# Patient Record
Sex: FEMALE | Race: WHITE | URBAN - METROPOLITAN AREA
[De-identification: names, ages, dates, MRNs, and addresses within clinical notes are randomized per-mention and may not be internally consistent; named-entity substitution may affect disease eponyms.]

---

## 2021-11-24 ENCOUNTER — OFFICE VISIT (OUTPATIENT)
Dept: INTEGRATIVE MEDICINE | Facility: CLINIC | Age: 31
End: 2021-11-24
Payer: COMMERCIAL

## 2021-11-24 DIAGNOSIS — R10.30 LOWER ABDOMINAL PAIN: Primary | ICD-10-CM

## 2021-11-24 PROCEDURE — 97810 ACUP 1/> WO ESTIM 1ST 15 MIN: CPT | Performed by: ACUPUNCTURIST

## 2021-11-24 PROCEDURE — 97811 ACUP 1/> W/O ESTIM EA ADD 15: CPT | Performed by: ACUPUNCTURIST

## 2021-11-24 NOTE — PROGRESS NOTES
Chief Complaint(s):  Lower abdominal pain    Subjective  Bevtofchuyita is coming in today for a follow up visit at this location since she couldn't get in to my private practice today.   She has some slight lower abdomen cramping but it is manageable and better s